# Patient Record
Sex: FEMALE | Race: WHITE | NOT HISPANIC OR LATINO | URBAN - METROPOLITAN AREA
[De-identification: names, ages, dates, MRNs, and addresses within clinical notes are randomized per-mention and may not be internally consistent; named-entity substitution may affect disease eponyms.]

---

## 2020-06-18 ENCOUNTER — TELEPHONE ENCOUNTER (OUTPATIENT)
Dept: URBAN - METROPOLITAN AREA CLINIC 92 | Facility: CLINIC | Age: 70
End: 2020-06-18

## 2020-06-18 ENCOUNTER — LAB OUTSIDE AN ENCOUNTER (OUTPATIENT)
Dept: URBAN - METROPOLITAN AREA CLINIC 86 | Facility: CLINIC | Age: 70
End: 2020-06-18

## 2020-06-19 ENCOUNTER — TELEPHONE ENCOUNTER (OUTPATIENT)
Dept: URBAN - METROPOLITAN AREA CLINIC 92 | Facility: CLINIC | Age: 70
End: 2020-06-19

## 2020-06-19 LAB
ALBUMIN: 4.2
ALKALINE PHOSPHATASE: 152
ALT (SGPT): 188
AST (SGOT): 160
BILIRUBIN, DIRECT: 0.16
BILIRUBIN, TOTAL: 0.5
PROTEIN, TOTAL: 6.9

## 2020-07-11 ENCOUNTER — TELEPHONE ENCOUNTER (OUTPATIENT)
Dept: URBAN - METROPOLITAN AREA CLINIC 92 | Facility: CLINIC | Age: 70
End: 2020-07-11

## 2020-07-11 NOTE — HPI-OTHER HISTORIES
Zack,  saw your letter of june 22 2020.  Jan 2020 ast 65 and alt 69 and alk 138.  June 2020 ast 160 and alt 188 and alkt 152.  Of the med list you sent, tizanidine stands out as you mentioned was even higher and the lowered.  Per livertox: Tizanidine: "Transient and asymptomatic elevations in serum ALT greater than 3 times the upper limit of normal occur in ~5% of patients taking tizanidine compared to 0.4% of subjects on placebo. Reports of severe hepatotoxicity, acute liver failure and death have been mentioned in review articles on tizanidine, but few case reports have been published. In these reports, the latency to onset of jaundice has ranged from 2 to 14 weeks and the enzyme elevations have been both cholestatic and hepatocellular (Case 1). Immunoallergic and autoimmune features have not been mentioned. Recovery was complete after 1 to 2 months.  Likelihood score: C (Probable rare cause of clinically apparent liver injury)."  We need to follow labs now on lower dose and if lower, then points that this is the cause of the issue.  Please share this letter with local provider managing this so that they are awate of the issue.  Dr Garcia.

## 2020-07-21 ENCOUNTER — TELEPHONE ENCOUNTER (OUTPATIENT)
Dept: URBAN - METROPOLITAN AREA CLINIC 92 | Facility: CLINIC | Age: 70
End: 2020-07-21

## 2020-07-23 ENCOUNTER — WEB ENCOUNTER (OUTPATIENT)
Dept: URBAN - METROPOLITAN AREA CLINIC 92 | Facility: CLINIC | Age: 70
End: 2020-07-23

## 2020-07-25 ENCOUNTER — TELEPHONE ENCOUNTER (OUTPATIENT)
Dept: URBAN - METROPOLITAN AREA CLINIC 92 | Facility: CLINIC | Age: 70
End: 2020-07-25

## 2020-07-25 NOTE — HPI-TODAY'S VISIT:
This is a scheduled follow-up appointment for this patient, a 69 year old /White female, after a previous visit on Jan 2020 for an evaluation for elevated liver function tests and and hx of DILI. Recent liver function tests, AST and ALT, are elevated (see labs which were reviewed in the patients records ). The patient reports no significant symptoms related to the elevated LFT's.  The patient relates no significant family or personal history of liver disease. She states no history of new medications or alcohol use. The patient reports a personal history of no other habits that could cause liver damage.  Interval notes:     Pt here for follow up for abnormal lfts.  she stopped montelukast prior to pati due to skin reaction/palpitations. she has cervical stenosis and seen neurosx. she may need sx.  she has a lot of sensitivity. she had labs done prior to stopping singular. she is now on robaxin. seeing dr. zayda davis and may have sx done.    labs 12/20/19 alp 140 ast 56 alt 60  labs 10/31/19 wbc 6 hgb 14.8 plt 298 alp 129 ast 59 alt 53  11/21/19 u/s liver is normal size and contour.  Pt was lost to follow up due to family health issues and last seen nov 2016 and now retruns in Oct 2019.  She says she had ankle surgery issues and she had issues and pain issues and she is now back.  She said that this year saw Dr Hobbs and he encouraged her to come back.  Multiple bronchitis bouts since new years and she says Pulmonologist wanted to montelukast and see how does off this.   We advised can do and just needs close labs.  She lost her voice from asthma and allergies. Prior she had dysphonia also and so now an issue for her.  She says that when went to er oxygen was ok but felt short of breath.  She has lost likely 20 pounds and she says 10 was this year.    saw some issues and is stable and back on protonix and back on this now at 20mg.  Has been seeing Dr Hobbs and he last did egd 9-27-19 and mild pathcy erythema in eg junction and bx taken with cold forceps and mildly erythematous mucosa in gastric fundus and body and antrum.   She says neurologist saw her for her pain and she wanted botox for this and says insurane may not do it. Other options: flexeril/baclofen/klonopin: robaxin better liver tolerated as muscle relaxant Flexeril can inc lfts as can baclofen.   Prior she has been on aleve and occ tylenol. Aleve upsets her stomach.  Needs labs to see what baseline.  Needs liver u.s also to be done.  Says lmg lfts 50s to 70s. Prior had been better.  4-18-17 and wbc 5.2 hg 14.6 plat 268 and glu 87 and cr 0.8 and na 141 and k 4.6 and cl 106 and ca 9.3 and alb 3.8 and tb 0.7 and alk 106 and ast 52 and alt 66.  Has a vocal cord specialist who had seen in past.   9/3/15 wbc 7.3, rbc 4.51, hg 14.2, plat 265, gluc 99, cr 0.92, na 140, k 4.2, albumin 3.9, t bili 0.4, alk 120 high, ast 39, alt 43.   Pt is known to have abnormal liver enzymes and suspected a drug induced process related to Prozac and possibly Wellbutrin. She stopped Prozac in 2013 and Wellbutrin in 2014. She was on prozac for over 20 years.   Feb 2015 labs: wbc 6.3, hg 14.5, plat 279, sugar 66, cr 0.99, na 137 and k 5, ast 55 and alt 59 and alk 148, bili 0.5, alb 4.2.   Nov 2015 ast 45 and alt 57 alk 133.   2/10/15 ultrasound was limited by bowel gas, but liver appeared normal in size and contour, gallbladder removed, no dilatation of biliary tree, patent hepatic vessels.   Duration of visit 25 minutes with greater than 50% of time spent reviewing chart  and prior notes and work up and then in discussion with the patient.

## 2020-07-28 ENCOUNTER — OFFICE VISIT (OUTPATIENT)
Dept: URBAN - METROPOLITAN AREA TELEHEALTH 2 | Facility: TELEHEALTH | Age: 70
End: 2020-07-28
Payer: MEDICARE

## 2020-07-28 DIAGNOSIS — R94.5 ABNORMAL LFTS: ICD-10-CM

## 2020-07-28 DIAGNOSIS — F41.9 ANXIETY AND DEPRESSION: ICD-10-CM

## 2020-07-28 DIAGNOSIS — E66.3 OVERWEIGHT: ICD-10-CM

## 2020-07-28 DIAGNOSIS — Z79.899 HIGH RISK MEDICATION USE: ICD-10-CM

## 2020-07-28 DIAGNOSIS — Z98.890 HISTORY OF COLONOSCOPY: ICD-10-CM

## 2020-07-28 DIAGNOSIS — K71.9 DRUG INDUCED LIVER DISEASE: ICD-10-CM

## 2020-07-28 DIAGNOSIS — K21.0 REFLUX ESOPHAGITIS: ICD-10-CM

## 2020-07-28 DIAGNOSIS — Z71.89 VACCINE COUNSELING: ICD-10-CM

## 2020-07-28 DIAGNOSIS — E03.9 HYPOTHYROIDISM, UNSPECIFIED TYPE: ICD-10-CM

## 2020-07-28 DIAGNOSIS — E78.5 HYPERLIPIDEMIA: ICD-10-CM

## 2020-07-28 DIAGNOSIS — K71.8 TOXIC LIVER DISEASE WITH OTHER DISORDERS OF LIVER: ICD-10-CM

## 2020-07-28 PROCEDURE — G8427 DOCREV CUR MEDS BY ELIG CLIN: HCPCS

## 2020-07-28 PROCEDURE — G8417 CALC BMI ABV UP PARAM F/U: HCPCS

## 2020-07-28 PROCEDURE — 1036F TOBACCO NON-USER: CPT

## 2020-07-28 PROCEDURE — 99214 OFFICE O/P EST MOD 30 MIN: CPT

## 2020-07-28 PROCEDURE — G9903 PT SCRN TBCO ID AS NON USER: HCPCS

## 2020-07-28 RX ORDER — SUCRALFATE 1 G/10ML
TAKE 2 TEASPOONS BY ORAL ROUTE 2 TIMES PER DAY ON AN EMPTY STOMACH 1 HOUR BEFORE LUNCH AND AT BEDTIME SUSPENSION ORAL 2
Qty: 150 | Refills: 2 | Status: DISCONTINUED | COMMUNITY
Start: 2019-10-03

## 2020-07-28 RX ORDER — NAPROXEN SODIUM 220 MG/1
1 TABLET WITH FOOD OR MILK AS NEEDED TABLET ORAL
Refills: 0 | Status: ACTIVE | COMMUNITY
Start: 1900-01-01

## 2020-07-28 RX ORDER — LORAZEPAM 1 MG/1
TAKE 2 TABLETS (2 MG) BY ORAL ROUTE ONCE DAILY AT BEDTIME AS NEEDED TABLET ORAL 1
Qty: 0 | Refills: 0 | Status: ACTIVE | COMMUNITY
Start: 1900-01-01

## 2020-07-28 RX ORDER — ALBUTEROL SULFATE 90 UG/1
1 PUFF AS NEEDED AEROSOL, METERED RESPIRATORY (INHALATION)
Status: ACTIVE | COMMUNITY

## 2020-07-28 RX ORDER — ACETAMINOPHEN 325 MG/1
1 TABLET AS NEEDED TABLET, FILM COATED ORAL
Refills: 0 | Status: ACTIVE | COMMUNITY
Start: 1900-01-01

## 2020-07-28 RX ORDER — LEVOMEFOLATE/ALGAL OIL 15-90.314
TAKE 1 CAPSULE BY ORAL ROUTE DAILY CAPSULE ORAL 1
Qty: 0 | Refills: 0 | Status: DISCONTINUED | COMMUNITY
Start: 1900-01-01

## 2020-07-28 RX ORDER — LITHIUM CARBONATE 300 MG/1
1 CAPSULE AT BEDTIME CAPSULE, GELATIN COATED ORAL ONCE A DAY
Status: ACTIVE | COMMUNITY

## 2020-07-28 RX ORDER — CONJUGATED ESTROGENS 0.62 MG/G
EVERY PTHER WEEK CREAM VAGINAL
Refills: 0 | Status: ACTIVE | COMMUNITY
Start: 1900-01-01

## 2020-07-28 NOTE — HPI-TODAY'S VISIT:
This is a scheduled follow-up appointment for this patient, a 69 year old /White female, after a previous visit on 2020 for an evaluation for elevated liver function tests and and hx of DILI.  The patient reports no significant symptoms related to the elevated LFT's.    The patient relates no significant family or personal history of liver disease. She states no history of new medications or alcohol use. The patient reports a personal history of no other habits that could cause liver damage.   Interval notes:   She stopped montelukast prior to 2019 due to skin reaction/palpitations.  She has cervical stenosis and seen neurosurgery and she did have surgery 20.  She has a lot of sensitivity to meds. Dr. Chadwick Staples and may have sx done.   Zack sent letter of 2020.  2020 ast 65 and alt 69 and alk 138.  2020 ast 160 and alt 188 and alkt 152.  Of the med list that she sent, tizanidine stood out as you mentioned was even higher and the lowered.  She stopped it 2020: she spoke with neurolosurgeon and also neurologist and did stop off.  Per livertox: Tizanidine: "Transient and asymptomatic elevations in serum ALT greater than 3 times the upper limit of normal occur in ~5% of patients taking tizanidine compared to 0.4% of subjects on placebo. Reports of severe hepatotoxicity, acute liver failure and death have been mentioned in review articles on tizanidine, but few case reports have been published. In these reports, the latency to onset of jaundice has ranged from 2 to 14 weeks and the enzyme elevations have been both cholestatic and hepatocellular (Case 1). Immunoallergic and autoimmune features have not been mentioned. Recovery was complete after 1 to 2 months.  Likelihood score: C (Probable rare cause of clinically apparent liver injury)."  Did labs on :  alb 3.8 and tb 0.4 and alk 122 and ast 74 and alt 82.   Labs better off the tizanidine.  Need to do an u/s.  deplin:  looked up re this: Per webmd: "Signs of an allergic reaction, like rash; hives; itching; red, swollen, blistered, or peeling skin with or without fever; wheezing; tightness in the chest or throat; trouble breathing, swallowing, or talking; unusual hoarseness; or swelling of the mouth, face, lips, tongue, or throat. A burning or tingling feeling that is not normal. Swelling. What are some other side effects of this drug? All drugs may cause side effects. However, many people have no side effects or only have minor side effects. Call your doctor or get medical help if any of these side effects or any other side effects bother you or do not go away:  Upset stomach or throwing up. Belly pain. Not hungry. Diarrhea. Feeling sleepy. Headache. Pimples (acne)."   19 alp 140 ast 56 alt 60  10/31/19 wbc 6 hgb 14.8 plt 298 alp 129 ast 59 alt 53  19 u/s liver is normal size and contour.  Pt was lost to follow up due to family health issues and from 2016 and returned in Oct 2019.  She said that this year saw Dr Hobbs and he had encouraged her to come back to see us.  Multiple bronchitis bouts since new years and she says Pulmonologist wanted to montelukast and see how does off this.   Prior she lost her voice from asthma and allergies. Prior she had dysphonia also and so now an issue for her.  She says that when went to er oxygen was ok but felt short of breath.  She had prior lost likely 20 pounds and she says 10 was this year and says she is down to 135. Lowest 125 and up to 135 now.  Has been seeing Dr Hobbs and he last did egd 19 and mild patchy erythema in eg junction and bx taken with cold forceps and mildly erythematous mucosa in gastric fundus and body and antrum.   She says neurologist saw her for her pain and she wanted botox for this and says insurane is covering this.  She says that it helped her a lot to help with her muscle issues.  17 and wbc 5.2 hg 14.6 plat 268 and glu 87 and cr 0.8 and na 141 and k 4.6 and cl 106 and ca 9.3 and alb 3.8 and tb 0.7 and alk 106 and ast 52 and alt 66.  Has a vocal cord specialist who had seen in past.   9/3/15 wbc 7.3, rbc 4.51, hg 14.2, plat 265, gluc 99, cr 0.92, na 140, k 4.2, albumin 3.9, t bili 0.4, alk 120 high, ast 39, alt 43.   Pt is known to have abnormal liver enzymes and suspected a drug induced process related to Prozac and possibly Wellbutrin. She stopped Prozac in  and Wellbutrin in . She was on prozac for over 20 years.   2015 labs: wbc 6.3, hg 14.5, plat 279, sugar 66, cr 0.99, na 137 and k 5, ast 55 and alt 59 and alk 148, bili 0.5, alb 4.2.   2015 ast 45 and alt 57 alk 133.   2/10/15 ultrasound was limited by bowel gas, but liver appeared normal in size and contour, gallbladder removed, no dilatation of biliary tree, patent hepatic vessels.   Stressed to pt the need for social distancing and strict handwashing and wearing a mask and to follow any other new or added CDC recommendations as this is an evolving target.  Duration of visit 26 minutes with greater than 50% of time spent reviewing chart  and prior notes and work up and then in discussion with the patient.   Attempts were made to use real-time two-way video technology for today's encounter. Due to a technological failure or access issues, telephone technology was used in lieu of an office visit due to the current COVID-19 pandemic crisis and need for social isolation.  Patient seen today via telehealth by agreement and consent of patient in light of current COVID-19 pandemic. I used video conferencing during the visit. The patient encounter is appropriate and reasonable under the circumstances given the patient's particular presentation at this time. The patient has been advised of the followin) the potential risks and limitations of this mode of treatment (including but not limited to the absence of in-person examination); 2) the right to refuse telehealth services at any point without affecting the right to future care; 3) the right to receive in-person services, included immediately after this consultation if an urgent need arises; 4) information, including identifiable images or information from this telehealth consult, will only be shared in accordance with HIPAA regulations. Any and all of the patient's and/or patient's family member's questions on this issue have been answered. The patient has verbally consented to be treated via telehealth services. The patient has also been advised to contact this office for worsening conditions or problems, and seek emergency medical treatment and/or call 911 if the patient deems either necessary.

## 2020-08-19 ENCOUNTER — OFFICE VISIT (OUTPATIENT)
Dept: URBAN - METROPOLITAN AREA CLINIC 77 | Facility: CLINIC | Age: 70
End: 2020-08-19
Payer: MEDICARE

## 2020-08-19 DIAGNOSIS — R74.8 ABNORMAL ALKALINE PHOSPHATASE TEST: ICD-10-CM

## 2020-08-19 PROCEDURE — 93975 VASCULAR STUDY: CPT

## 2020-08-19 PROCEDURE — 76705 ECHO EXAM OF ABDOMEN: CPT

## 2020-08-19 RX ORDER — LORAZEPAM 1 MG/1
TAKE 2 TABLETS (2 MG) BY ORAL ROUTE ONCE DAILY AT BEDTIME AS NEEDED TABLET ORAL 1
Qty: 0 | Refills: 0 | Status: ACTIVE | COMMUNITY
Start: 1900-01-01

## 2020-08-19 RX ORDER — ALBUTEROL SULFATE 90 UG/1
1 PUFF AS NEEDED AEROSOL, METERED RESPIRATORY (INHALATION)
Status: ACTIVE | COMMUNITY

## 2020-08-19 RX ORDER — ACETAMINOPHEN 325 MG/1
1 TABLET AS NEEDED TABLET, FILM COATED ORAL
Refills: 0 | Status: ACTIVE | COMMUNITY
Start: 1900-01-01

## 2020-08-19 RX ORDER — LITHIUM CARBONATE 300 MG/1
1 CAPSULE AT BEDTIME CAPSULE, GELATIN COATED ORAL ONCE A DAY
Status: ACTIVE | COMMUNITY

## 2020-08-19 RX ORDER — CONJUGATED ESTROGENS 0.62 MG/G
EVERY PTHER WEEK CREAM VAGINAL
Refills: 0 | Status: ACTIVE | COMMUNITY
Start: 1900-01-01

## 2020-08-19 RX ORDER — NAPROXEN SODIUM 220 MG/1
1 TABLET WITH FOOD OR MILK AS NEEDED TABLET ORAL
Refills: 0 | Status: ACTIVE | COMMUNITY
Start: 1900-01-01

## 2020-08-20 ENCOUNTER — TELEPHONE ENCOUNTER (OUTPATIENT)
Dept: URBAN - METROPOLITAN AREA CLINIC 92 | Facility: CLINIC | Age: 70
End: 2020-08-20

## 2020-08-20 NOTE — HPI-TODAY'S VISIT:
Dealinden Dixon, U.s: back: no suspicious lesions. Absent gallbladder. No biie duct dilation. Patent vessels. Spleen 11.5 cm. Right kidney 9.7cm and no hydronephrosis. Pancreas appears normal.  Liver normal echogeicity.   Thanks  Dr Garcia

## 2020-08-21 ENCOUNTER — LAB OUTSIDE AN ENCOUNTER (OUTPATIENT)
Dept: URBAN - METROPOLITAN AREA CLINIC 86 | Facility: CLINIC | Age: 70
End: 2020-08-21

## 2020-08-22 ENCOUNTER — TELEPHONE ENCOUNTER (OUTPATIENT)
Dept: URBAN - METROPOLITAN AREA CLINIC 92 | Facility: CLINIC | Age: 70
End: 2020-08-22

## 2020-08-22 LAB
A/G RATIO: 1.5
ALBUMIN: 4.3
ALKALINE PHOSPHATASE: 137
ALT (SGPT): 88
AST (SGOT): 81
BILIRUBIN, TOTAL: 0.4
BUN/CREATININE RATIO: 16
BUN: 14
CALCIUM: 9.8
CARBON DIOXIDE, TOTAL: 24
CHLORIDE: 102
CREATININE: 0.85
EGFR IF AFRICN AM: 80
EGFR IF NONAFRICN AM: 70
GLOBULIN, TOTAL: 2.9
GLUCOSE: 82
POTASSIUM: 4.5
PROTEIN, TOTAL: 7.2
SODIUM: 139

## 2020-08-22 NOTE — HPI-TODAY'S VISIT:
Dear Zack Feng The results of your recent tests are explained below: glu 82 and bun 14 and cr 0.85 and na 139 and k 4.5 and cl 102 and ca 9.8 and alb 4.3 and tb 0.4 and alk 137 and asr 81 and alt 88 and desired <35.  In june 2020 ast 160 and alt 188 and alk 152 so coming down from this and need to track this.  May have been the tizanidine as we discussed.  Need to follow over time.

## 2020-08-24 ENCOUNTER — LAB OUTSIDE AN ENCOUNTER (OUTPATIENT)
Dept: URBAN - METROPOLITAN AREA TELEHEALTH 2 | Facility: TELEHEALTH | Age: 70
End: 2020-08-24

## 2020-09-14 ENCOUNTER — LAB OUTSIDE AN ENCOUNTER (OUTPATIENT)
Dept: URBAN - METROPOLITAN AREA TELEHEALTH 2 | Facility: TELEHEALTH | Age: 70
End: 2020-09-14

## 2020-09-18 ENCOUNTER — TELEPHONE ENCOUNTER (OUTPATIENT)
Dept: URBAN - METROPOLITAN AREA CLINIC 92 | Facility: CLINIC | Age: 70
End: 2020-09-18

## 2020-09-18 LAB
ALBUMIN: 4.2
ALKALINE PHOSPHATASE: 121
ALT (SGPT): 82
AST (SGOT): 72
BILIRUBIN, DIRECT: 0.11
BILIRUBIN, TOTAL: 0.3
PROTEIN, TOTAL: 6.6

## 2020-09-18 NOTE — HPI-OTHER HISTORIES
Dear Zack Feng The results of your recent tests are explained below: labs MUCH lower ast 72 from 160. alt down to 82 from 188. alk down to 121 from 152 and tb down to 0.3 from 0.5 for the comparison points listed. Tizanidine I  believe was the medicine in question.

## 2020-09-28 ENCOUNTER — OFFICE VISIT (OUTPATIENT)
Dept: URBAN - METROPOLITAN AREA TELEHEALTH 2 | Facility: TELEHEALTH | Age: 70
End: 2020-09-28
Payer: MEDICARE

## 2020-09-28 ENCOUNTER — TELEPHONE ENCOUNTER (OUTPATIENT)
Dept: URBAN - METROPOLITAN AREA CLINIC 6 | Facility: CLINIC | Age: 70
End: 2020-09-28

## 2020-09-28 DIAGNOSIS — R94.5 ABNORMAL LFTS: ICD-10-CM

## 2020-09-28 DIAGNOSIS — E66.3 OVERWEIGHT: ICD-10-CM

## 2020-09-28 DIAGNOSIS — K21.0 REFLUX ESOPHAGITIS: ICD-10-CM

## 2020-09-28 DIAGNOSIS — E03.9 HYPOTHYROIDISM, UNSPECIFIED TYPE: ICD-10-CM

## 2020-09-28 DIAGNOSIS — K71.9 DRUG INDUCED LIVER DISEASE: ICD-10-CM

## 2020-09-28 DIAGNOSIS — E78.5 HYPERLIPIDEMIA: ICD-10-CM

## 2020-09-28 DIAGNOSIS — F41.9 ANXIETY AND DEPRESSION: ICD-10-CM

## 2020-09-28 PROCEDURE — G8420 CALC BMI NORM PARAMETERS: HCPCS

## 2020-09-28 PROCEDURE — 3017F COLORECTAL CA SCREEN DOC REV: CPT

## 2020-09-28 PROCEDURE — 99214 OFFICE O/P EST MOD 30 MIN: CPT

## 2020-09-28 PROCEDURE — G9903 PT SCRN TBCO ID AS NON USER: HCPCS

## 2020-09-28 PROCEDURE — G8427 DOCREV CUR MEDS BY ELIG CLIN: HCPCS

## 2020-09-28 PROCEDURE — 1036F TOBACCO NON-USER: CPT

## 2020-09-28 RX ORDER — LITHIUM CARBONATE 300 MG/1
1 CAPSULE AT BEDTIME CAPSULE, GELATIN COATED ORAL ONCE A DAY
Status: ACTIVE | COMMUNITY

## 2020-09-28 RX ORDER — ACETAMINOPHEN 325 MG/1
1 TABLET AS NEEDED TABLET, FILM COATED ORAL
Refills: 0 | Status: ACTIVE | COMMUNITY
Start: 1900-01-01

## 2020-09-28 RX ORDER — CONJUGATED ESTROGENS 0.62 MG/G
EVERY PTHER WEEK CREAM VAGINAL
Refills: 0 | Status: ACTIVE | COMMUNITY
Start: 1900-01-01

## 2020-09-28 RX ORDER — ALBUTEROL SULFATE 90 UG/1
1 PUFF AS NEEDED AEROSOL, METERED RESPIRATORY (INHALATION)
Status: ACTIVE | COMMUNITY

## 2020-09-28 RX ORDER — LEVOMEFOLATE/ALGAL OIL 15-90.314
1 CAPSULE CAPSULE ORAL ONCE A DAY
Status: ACTIVE | COMMUNITY

## 2020-09-28 RX ORDER — LORAZEPAM 1 MG/1
TAKE 2 TABLETS (2 MG) BY ORAL ROUTE ONCE DAILY AT BEDTIME AS NEEDED TABLET ORAL 1
Qty: 0 | Refills: 0 | Status: ACTIVE | COMMUNITY
Start: 1900-01-01

## 2020-09-28 RX ORDER — NAPROXEN SODIUM 220 MG/1
1 TABLET WITH FOOD OR MILK AS NEEDED TABLET ORAL
Refills: 0 | Status: ACTIVE | COMMUNITY
Start: 1900-01-01

## 2020-09-28 NOTE — HPI-TODAY'S VISIT:
This is a scheduled follow-up appointment for this patient, a 70 year old /White female, after a previous visit on 2020 for an evaluation for elevated liver function tests and and hx of DILI.  The patient reports no significant symptoms related to the elevated LFT's.    The patient relates no significant family or personal history of liver disease. She states no history of new medications or alcohol use. The patient reports a personal history of no other habits that could cause liver damage.   Interval notes:   2020  labs MUCH lower ast 72 from 160. alt down to 82 from 188. alk down to 121 from 152 and tb down to 0.3 from 0.5 for the comparison points listed.   Tizanidine I  believe was the medicine in question.  She has been off this 2020.  Prior labs  glu 82 and bun 14 and cr 0.85 and na 139 and k 4.5 and cl 102 and ca 9.8 and alb 4.3 and tb 0.4 and alk 137 and ast 81 and alt 88 and desired  less than 35.   In 2020 ast 160 and alt 188 and alk 152 so coming down from this and need to track this.    U.s: back: no suspicious lesions. Absent gallbladder. No biie duct dilation. Patent vessels. Spleen 11.5 cm. Right kidney 9.7cm and no hydronephrosis. Pancreas appears normal.  Liver normal echogeicity.    She is to do sleep study for fatigue.  She stopped montelukast prior to 2019 due to skin reaction/palpitations.  She has cervical stenosis and seen neurosurgery and she did have surgery 20.  She has a lot of sensitivity to meds. Dr. Chadwick Staples.   Zack sent letter of 2020.  2020 ast 65 and alt 69 and alk 138.  2020 ast 160 and alt 188 and alkt 152.  She stopped it 2020: she spoke with neurosurgeon and also neurologist and did stop off.  Per livertox: Tizanidine: "Transient and asymptomatic elevations in serum ALT greater than 3 times the upper limit of normal occur in ~5% of patients taking tizanidine compared to 0.4% of subjects on placebo. Reports of severe hepatotoxicity, acute liver failure and death have been mentioned in review articles on tizanidine, but few case reports have been published. In these reports, the latency to onset of jaundice has ranged from 2 to 14 weeks and the enzyme elevations have been both cholestatic and hepatocellular (Case 1). Immunoallergic and autoimmune features have not been mentioned. Recovery was complete after 1 to 2 months.  Likelihood score: C (Probable rare cause of clinically apparent liver injury)."  deplin:  looked up re this: Per webmd: "Signs of an allergic reaction, like rash; hives; itching; red, swollen, blistered, or peeling skin with or without fever; wheezing; tightness in the chest or throat; trouble breathing, swallowing, or talking; unusual hoarseness; or swelling of the mouth, face, lips, tongue, or throat. A burning or tingling feeling that is not normal. Swelling. What are some other side effects of this drug? All drugs may cause side effects. However, many people have no side effects or only have minor side effects. Call your doctor or get medical help if any of these side effects or any other side effects bother you or do not go away:  Upset stomach or throwing up. Belly pain. Not hungry. Diarrhea. Feeling sleepy. Headache. Pimples (acne)."   19 alp 140 ast 56 alt 60  10/31/19 wbc 6 hgb 14.8 plt 298 alp 129 ast 59 alt 53  19 u/s liver is normal size and contour.  Pt was lost to follow up due to family health issues and from 2016 and returned in Oct 2019.  She saw in 2019 saw Dr Hobbs and he had encouraged her to come back to see us.  Prior she lost her voice from asthma and allergies. Prior she had dysphonia also and so now an issue for her.  She had prior lost likely 20 pounds and she says 10 was this year and says she is down to 135. Lowest 125 with surgery and up to 135 last time and now 134.   Has been seeing Dr Hobbs and he last did egd 19 and mild patchy erythema in eg junction and bx taken with cold forceps and mildly erythematous mucosa in gastric fundus and body and antrum.   She says neurologist saw her for her pain and she wanted botox for this and says insurane is covering this.  She says that it helped her a lot to help with her muscle issues.  -17 and wbc 5.2 hg 14.6 plat 268 and glu 87 and cr 0.8 and na 141 and k 4.6 and cl 106 and ca 9.3 and alb 3.8 and tb 0.7 and alk 106 and ast 52 and alt 66.  Has a vocal cord specialist who had seen in past.   9/3/15 wbc 7.3, rbc 4.51, hg 14.2, plat 265, gluc 99, cr 0.92, na 140, k 4.2, albumin 3.9, t bili 0.4, alk 120 high, ast 39, alt 43.   Pt is known to have abnormal liver enzymes and suspected a drug induced process related to Prozac and possibly Wellbutrin. She stopped Prozac in  and Wellbutrin in . She was on prozac for over 20 years.   2015 labs: wbc 6.3, hg 14.5, plat 279, sugar 66, cr 0.99, na 137 and k 5, ast 55 and alt 59 and alk 148, bili 0.5, alb 4.2.   2015 ast 45 and alt 57 alk 133.   2/10/15 ultrasound was limited by bowel gas, but liver appeared normal in size and contour, gallbladder removed, no dilatation of biliary tree, patent hepatic vessels.   Stressed to pt the need for social distancing and strict handwashing and wearing a mask and to follow any other new or added CDC recommendations as this is an evolving target.  Plan: 1. Pt will do labs in 6 and 12 weeks, 2. Started deplin aug 9 and had been off 5m. Need to watch the labs. 3. RTC in 3m.  Duration of visit 25 minutes via doximity with greater than 50% of time spent reviewing chart  and prior notes and work up and then in discussion with the patient.   More than half of the face-to-face time used for counseling and coordination of care.  Patient seen today via telehealth by agreement and consent of patient in light of current COVID-19 pandemic. I used video conferencing during the visit. The patient encounter is appropriate and reasonable under the circumstances given the patient's particular presentation at this time. The patient has been advised of the followin) the potential risks and limitations of this mode of treatment (including but not limited to the absence of in-person examination); 2) the right to refuse telehealth services at any point without affecting the right to future care; 3) the right to receive in-person services, included immediately after this consultation if an urgent need arises; 4) information, including identifiable images or information from this telehealth consult, will only be shared in accordance with HIPAA regulations. Any and all of the patient's and/or patient's family member's questions on this issue have been answered. The patient has verbally consented to be treated via telehealth services. The patient has also been advised to contact this office for worsening conditions or problems, and seek emergency medical treatment and/or call 911 if the patient deems either necessary.

## 2020-10-19 ENCOUNTER — LAB OUTSIDE AN ENCOUNTER (OUTPATIENT)
Dept: URBAN - METROPOLITAN AREA TELEHEALTH 2 | Facility: TELEHEALTH | Age: 70
End: 2020-10-19

## 2020-10-27 ENCOUNTER — TELEPHONE ENCOUNTER (OUTPATIENT)
Dept: URBAN - METROPOLITAN AREA CLINIC 92 | Facility: CLINIC | Age: 70
End: 2020-10-27

## 2020-10-27 LAB
ALBUMIN: 4.2
ALKALINE PHOSPHATASE: 140
ALT (SGPT): 72
AST (SGOT): 69
BILIRUBIN, DIRECT: 0.14
BILIRUBIN, TOTAL: 0.3
PROTEIN, TOTAL: 7

## 2020-10-27 NOTE — HPI-OTHER HISTORIES
Dear Zack Feng The results of your recent tests are explained below: oct 19: ast 69 and alt 72 and little lower from ast 72 and alt 82 and prior ast 160 and alt 188. tb 0.3. alb 4.2.

## 2020-11-30 ENCOUNTER — LAB OUTSIDE AN ENCOUNTER (OUTPATIENT)
Dept: URBAN - METROPOLITAN AREA TELEHEALTH 2 | Facility: TELEHEALTH | Age: 70
End: 2020-11-30

## 2020-12-08 ENCOUNTER — TELEPHONE ENCOUNTER (OUTPATIENT)
Dept: URBAN - METROPOLITAN AREA CLINIC 92 | Facility: CLINIC | Age: 70
End: 2020-12-08

## 2020-12-08 LAB
ALBUMIN: 4.1
ALKALINE PHOSPHATASE: 127
ALT (SGPT): 79
AST (SGOT): 70
BILIRUBIN, DIRECT: 0.15
BILIRUBIN, TOTAL: 0.5
PROTEIN, TOTAL: 6.9

## 2020-12-08 NOTE — HPI-OTHER HISTORIES
Dear Zack Feng The results of your recent tests are explained below: dec 7 alb 4.1 and tb 0.5 and alk 127 down from 140 and ast 70 and alt 79 and prior ast 69 and alt 72 so little bit of a flux but lower vs sept ast 72 and alt 82.  Any changes?

## 2020-12-16 ENCOUNTER — OFFICE VISIT (OUTPATIENT)
Dept: URBAN - METROPOLITAN AREA TELEHEALTH 2 | Facility: TELEHEALTH | Age: 70
End: 2020-12-16
Payer: MEDICARE

## 2020-12-16 DIAGNOSIS — E03.9 HYPOTHYROIDISM: ICD-10-CM

## 2020-12-16 DIAGNOSIS — E03.8 ADULT ONSET HYPOTHYROIDISM: ICD-10-CM

## 2020-12-16 DIAGNOSIS — Z71.89 VACCINE COUNSELING: ICD-10-CM

## 2020-12-16 DIAGNOSIS — Z98.890 HISTORY OF COLONOSCOPY: ICD-10-CM

## 2020-12-16 DIAGNOSIS — F41.9 ANXIETY AND DEPRESSION: ICD-10-CM

## 2020-12-16 DIAGNOSIS — R94.5 ABNORMAL LFTS: ICD-10-CM

## 2020-12-16 DIAGNOSIS — F41.8 ANXIETY ABOUT HEALTH: ICD-10-CM

## 2020-12-16 DIAGNOSIS — K71.9 DRUG INDUCED LIVER DISEASE: ICD-10-CM

## 2020-12-16 DIAGNOSIS — Z79.899 HIGH RISK MEDICATION USE: ICD-10-CM

## 2020-12-16 DIAGNOSIS — E78.5 HYPERLIPIDEMIA: ICD-10-CM

## 2020-12-16 PROCEDURE — G8427 DOCREV CUR MEDS BY ELIG CLIN: HCPCS | Performed by: PHYSICIAN ASSISTANT

## 2020-12-16 PROCEDURE — 99214 OFFICE O/P EST MOD 30 MIN: CPT | Performed by: PHYSICIAN ASSISTANT

## 2020-12-16 RX ORDER — LEVOMEFOLATE/ALGAL OIL 15-90.314
1 CAPSULE CAPSULE ORAL ONCE A DAY
Status: ACTIVE | COMMUNITY

## 2020-12-16 RX ORDER — NAPROXEN SODIUM 220 MG/1
1 TABLET WITH FOOD OR MILK AS NEEDED TABLET ORAL
Refills: 0 | Status: ACTIVE | COMMUNITY
Start: 1900-01-01

## 2020-12-16 RX ORDER — ACETAMINOPHEN 325 MG/1
1 TABLET AS NEEDED TABLET, FILM COATED ORAL
Refills: 0 | Status: ACTIVE | COMMUNITY
Start: 1900-01-01

## 2020-12-16 RX ORDER — ALBUTEROL SULFATE 90 UG/1
1 PUFF AS NEEDED AEROSOL, METERED RESPIRATORY (INHALATION)
Status: ACTIVE | COMMUNITY

## 2020-12-16 RX ORDER — LITHIUM CARBONATE 300 MG/1
1 CAPSULE AT BEDTIME CAPSULE, GELATIN COATED ORAL ONCE A DAY
Status: ACTIVE | COMMUNITY

## 2020-12-16 RX ORDER — CONJUGATED ESTROGENS 0.62 MG/G
EVERY PTHER WEEK CREAM VAGINAL
Refills: 0 | Status: ACTIVE | COMMUNITY
Start: 1900-01-01

## 2020-12-16 RX ORDER — LORAZEPAM 1 MG/1
TAKE 2 TABLETS (2 MG) BY ORAL ROUTE ONCE DAILY AT BEDTIME AS NEEDED TABLET ORAL 1
Qty: 0 | Refills: 0 | Status: ACTIVE | COMMUNITY
Start: 1900-01-01

## 2020-12-16 NOTE — HPI-TODAY'S VISIT:
This is a scheduled follow-up appointment for this patient, a 70 year old /White female, after a previous visit on Jan 2020 for an evaluation for elevated liver function tests and and hx of DILI.    The patient reports no significant symptoms related to the elevated LFT's.    her lfts have improved from june 2020 off tizanidine. still on deplin daily. her alp improved, but ast/alt stable around 70 range.   she has inc lithium dose for the past 2 weeks. she is taking lorezapam daily and liver tox: Hepatotoxicity Lorazepam, as with other benzodiazepines, is rarely associated with serum ALT elevations, and clinically apparent liver injury from lorazepam is extremely rare, if it occurs at all. There have been no case reports of symptomatic, acute liver injury from lorazepam. Cases of clinically apparent hepatitis have been reported with other benzodiazepines including alprazolam, chlordiazepoxide, clonazepam, diazepam, flurazepam and triazolam. The clinical pattern of acute liver injury from benzodiazepines is typically cholestatic and mild-to-moderate in severity with a latency of 1 to 6 months. Fever and rash are not common nor is autoantibody formation.  Likelihood score: E (Unlikely cause of clinically apparent liver injury).  RECAP: Sept 2020  labs MUCH lower ast 72 from 160. alt down to 82 from 188. alk down to 121 from 152 and tb down to 0.3 from 0.5 for the comparison points listed.   Tizanidine I  believe was the medicine in question.  She has been off this July 1 2020.  Prior labs  glu 82 and bun 14 and cr 0.85 and na 139 and k 4.5 and cl 102 and ca 9.8 and alb 4.3 and tb 0.4 and alk 137 and ast 81 and alt 88 and desired  less than 35.   In june 2020 ast 160 and alt 188 and alk 152 so coming down from this and need to track this.    U.s: back: no suspicious lesions. Absent gallbladder. No biie duct dilation. Patent vessels. Spleen 11.5 cm. Right kidney 9.7cm and no hydronephrosis. Pancreas appears normal.  Liver normal echogeicity.    She is to do sleep study for fatigue.  She stopped montelukast prior to christmas 2019 due to skin reaction/palpitations.  She has cervical stenosis and seen neurosurgery and she did have surgery 4-14-20.  She has a lot of sensitivity to meds. Dr. Chadwick StaplesEbonie Dixon sent letter of june 22 2020.  Jan 2020 ast 65 and alt 69 and alk 138.  June 2020 ast 160 and alt 188 and alkt 152.  She stopped it July 1, 2020: she spoke with neurosurgeon and also neurologist and did stop off.  Per livertox: Tizanidine: "Transient and asymptomatic elevations in serum ALT greater than 3 times the upper limit of normal occur in ~5% of patients taking tizanidine compared to 0.4% of subjects on placebo. Reports of severe hepatotoxicity, acute liver failure and death have been mentioned in review articles on tizanidine, but few case reports have been published. In these reports, the latency to onset of jaundice has ranged from 2 to 14 weeks and the enzyme elevations have been both cholestatic and hepatocellular (Case 1). Immunoallergic and autoimmune features have not been mentioned. Recovery was complete after 1 to 2 months.  Likelihood score: C (Probable rare cause of clinically apparent liver injury)."  deplin:  looked up re this: Per webmd: "Signs of an allergic reaction, like rash; hives; itching; red, swollen, blistered, or peeling skin with or without fever; wheezing; tightness in the chest or throat; trouble breathing, swallowing, or talking; unusual hoarseness; or swelling of the mouth, face, lips, tongue, or throat. A burning or tingling feeling that is not normal. Swelling. What are some other side effects of this drug? All drugs may cause side effects. However, many people have no side effects or only have minor side effects. Call your doctor or get medical help if any of these side effects or any other side effects bother you or do not go away:

## 2021-01-11 ENCOUNTER — TELEPHONE ENCOUNTER (OUTPATIENT)
Dept: URBAN - METROPOLITAN AREA CLINIC 92 | Facility: CLINIC | Age: 71
End: 2021-01-11

## 2021-01-12 ENCOUNTER — TELEPHONE ENCOUNTER (OUTPATIENT)
Dept: URBAN - METROPOLITAN AREA CLINIC 92 | Facility: CLINIC | Age: 71
End: 2021-01-12

## 2021-01-13 ENCOUNTER — LAB OUTSIDE AN ENCOUNTER (OUTPATIENT)
Dept: URBAN - METROPOLITAN AREA TELEHEALTH 2 | Facility: TELEHEALTH | Age: 71
End: 2021-01-13

## 2021-01-15 ENCOUNTER — LAB OUTSIDE AN ENCOUNTER (OUTPATIENT)
Dept: URBAN - METROPOLITAN AREA CLINIC 86 | Facility: CLINIC | Age: 71
End: 2021-01-15

## 2021-01-20 LAB
A/G RATIO: 1.5
ALBUMIN: 4.3
ALKALINE PHOSPHATASE: 131
ALT (SGPT): 88
AST (SGOT): 76
BILIRUBIN, TOTAL: 0.6
BUN/CREATININE RATIO: 10
BUN: 8
CALCIUM: 9.8
CARBON DIOXIDE, TOTAL: 25
CHLORIDE: 100
CREATININE: 0.81
EGFR IF AFRICN AM: 85
EGFR IF NONAFRICN AM: 73
GLOBULIN, TOTAL: 2.9
GLUCOSE: 85
POTASSIUM: 4.4
PROTEIN, TOTAL: 7.2
SODIUM: 137

## 2021-01-28 ENCOUNTER — WEB ENCOUNTER (OUTPATIENT)
Dept: URBAN - METROPOLITAN AREA CLINIC 86 | Facility: CLINIC | Age: 71
End: 2021-01-28

## 2021-01-29 ENCOUNTER — TELEPHONE ENCOUNTER (OUTPATIENT)
Dept: URBAN - METROPOLITAN AREA CLINIC 98 | Facility: CLINIC | Age: 71
End: 2021-01-29

## 2021-02-02 ENCOUNTER — OFFICE VISIT (OUTPATIENT)
Dept: URBAN - METROPOLITAN AREA CLINIC 97 | Facility: CLINIC | Age: 71
End: 2021-02-02
Payer: MEDICARE

## 2021-02-02 DIAGNOSIS — R74.8 ABNORMAL LIVER ENZYMES: ICD-10-CM

## 2021-02-02 DIAGNOSIS — K74.69 OTHER CIRRHOSIS OF LIVER: ICD-10-CM

## 2021-02-02 PROCEDURE — 93975 VASCULAR STUDY: CPT

## 2021-02-02 PROCEDURE — 76705 ECHO EXAM OF ABDOMEN: CPT

## 2021-02-02 RX ORDER — ALBUTEROL SULFATE 90 UG/1
1 PUFF AS NEEDED AEROSOL, METERED RESPIRATORY (INHALATION)
Status: ACTIVE | COMMUNITY

## 2021-02-02 RX ORDER — LITHIUM CARBONATE 300 MG/1
1 CAPSULE AT BEDTIME CAPSULE, GELATIN COATED ORAL ONCE A DAY
Status: ACTIVE | COMMUNITY

## 2021-02-02 RX ORDER — NAPROXEN SODIUM 220 MG/1
1 TABLET WITH FOOD OR MILK AS NEEDED TABLET ORAL
Refills: 0 | Status: ACTIVE | COMMUNITY
Start: 1900-01-01

## 2021-02-02 RX ORDER — LORAZEPAM 1 MG/1
TAKE 2 TABLETS (2 MG) BY ORAL ROUTE ONCE DAILY AT BEDTIME AS NEEDED TABLET ORAL 1
Qty: 0 | Refills: 0 | Status: ACTIVE | COMMUNITY
Start: 1900-01-01

## 2021-02-02 RX ORDER — LEVOMEFOLATE/ALGAL OIL 15-90.314
1 CAPSULE CAPSULE ORAL ONCE A DAY
Status: ACTIVE | COMMUNITY

## 2021-02-02 RX ORDER — ACETAMINOPHEN 325 MG/1
1 TABLET AS NEEDED TABLET, FILM COATED ORAL
Refills: 0 | Status: ACTIVE | COMMUNITY
Start: 1900-01-01

## 2021-02-02 RX ORDER — CONJUGATED ESTROGENS 0.62 MG/G
EVERY PTHER WEEK CREAM VAGINAL
Refills: 0 | Status: ACTIVE | COMMUNITY
Start: 1900-01-01

## 2021-02-09 ENCOUNTER — LAB OUTSIDE AN ENCOUNTER (OUTPATIENT)
Dept: URBAN - METROPOLITAN AREA CLINIC 92 | Facility: CLINIC | Age: 71
End: 2021-02-09

## 2021-02-09 ENCOUNTER — TELEPHONE ENCOUNTER (OUTPATIENT)
Dept: URBAN - METROPOLITAN AREA CLINIC 92 | Facility: CLINIC | Age: 71
End: 2021-02-09

## 2021-02-09 NOTE — HPI-OTHER HISTORIES
Dear Zack Feng, Feb 2021 u.s: patent vessels seen. Liver mildly coarse appearing to them. Spleen 10.7cm.  Right kidney 10.3cm. No hydronephrosis. Common bile duct 4mm and normal. Pancreas unremarkable. Liver not echogenic prior and so need labs to correlate with this and so please do lfts for us now to see if maybe liver more inflamed?  Dr Garcia

## 2021-02-10 ENCOUNTER — WEB ENCOUNTER (OUTPATIENT)
Dept: URBAN - METROPOLITAN AREA CLINIC 86 | Facility: CLINIC | Age: 71
End: 2021-02-10

## 2021-02-16 ENCOUNTER — LAB OUTSIDE AN ENCOUNTER (OUTPATIENT)
Dept: URBAN - METROPOLITAN AREA TELEHEALTH 2 | Facility: TELEHEALTH | Age: 71
End: 2021-02-16

## 2021-02-16 ENCOUNTER — WEB ENCOUNTER (OUTPATIENT)
Dept: URBAN - METROPOLITAN AREA CLINIC 86 | Facility: CLINIC | Age: 71
End: 2021-02-16

## 2021-02-17 ENCOUNTER — WEB ENCOUNTER (OUTPATIENT)
Dept: URBAN - METROPOLITAN AREA CLINIC 86 | Facility: CLINIC | Age: 71
End: 2021-02-17

## 2021-02-18 LAB
A/G RATIO: 1.6
ALBUMIN: 4.5
ALKALINE PHOSPHATASE: 135
ALT (SGPT): 79
AST (SGOT): 71
BILIRUBIN, TOTAL: 0.5
BUN/CREATININE RATIO: 16
BUN: 15
CALCIUM: 9.9
CARBON DIOXIDE, TOTAL: 25
CHLORIDE: 100
CREATININE: 0.94
EGFR IF AFRICN AM: 71
EGFR IF NONAFRICN AM: 61
GLOBULIN, TOTAL: 2.8
GLUCOSE: 103
POTASSIUM: 5.1
PROTEIN, TOTAL: 7.3
SODIUM: 136

## 2021-02-19 ENCOUNTER — WEB ENCOUNTER (OUTPATIENT)
Dept: URBAN - METROPOLITAN AREA CLINIC 86 | Facility: CLINIC | Age: 71
End: 2021-02-19

## 2021-02-25 ENCOUNTER — OFFICE VISIT (OUTPATIENT)
Dept: URBAN - METROPOLITAN AREA TELEHEALTH 2 | Facility: TELEHEALTH | Age: 71
End: 2021-02-25
Payer: MEDICARE

## 2021-02-25 DIAGNOSIS — R94.5 ABNORMAL LFTS: ICD-10-CM

## 2021-02-25 DIAGNOSIS — E78.5 HYPERLIPIDEMIA: ICD-10-CM

## 2021-02-25 DIAGNOSIS — Z79.899 HIGH RISK MEDICATION USE: ICD-10-CM

## 2021-02-25 DIAGNOSIS — R93.2 ABNORMAL LIVER ULTRASOUND: ICD-10-CM

## 2021-02-25 PROCEDURE — 99213 OFFICE O/P EST LOW 20 MIN: CPT | Performed by: PHYSICIAN ASSISTANT

## 2021-02-25 PROCEDURE — 99215 OFFICE O/P EST HI 40 MIN: CPT | Performed by: PHYSICIAN ASSISTANT

## 2021-02-25 RX ORDER — ACETAMINOPHEN 325 MG/1
1 TABLET AS NEEDED TABLET, FILM COATED ORAL
Refills: 0 | Status: ACTIVE | COMMUNITY
Start: 1900-01-01

## 2021-02-25 RX ORDER — NAPROXEN SODIUM 220 MG/1
1 TABLET WITH FOOD OR MILK AS NEEDED TABLET ORAL
Refills: 0 | Status: ACTIVE | COMMUNITY
Start: 1900-01-01

## 2021-02-25 RX ORDER — LITHIUM CARBONATE 300 MG/1
1 CAPSULE AT BEDTIME CAPSULE, GELATIN COATED ORAL ONCE A DAY
Status: ACTIVE | COMMUNITY

## 2021-02-25 RX ORDER — LEVOMEFOLATE/ALGAL OIL 15-90.314
1 CAPSULE CAPSULE ORAL ONCE A DAY
Status: ACTIVE | COMMUNITY

## 2021-02-25 RX ORDER — CONJUGATED ESTROGENS 0.62 MG/G
EVERY PTHER WEEK CREAM VAGINAL
Refills: 0 | Status: ACTIVE | COMMUNITY
Start: 1900-01-01

## 2021-02-25 RX ORDER — ALBUTEROL SULFATE 90 UG/1
1 PUFF AS NEEDED AEROSOL, METERED RESPIRATORY (INHALATION)
Status: ACTIVE | COMMUNITY

## 2021-02-25 RX ORDER — LORAZEPAM 1 MG/1
TAKE 2 TABLETS (2 MG) BY ORAL ROUTE ONCE DAILY AT BEDTIME AS NEEDED TABLET ORAL 1
Qty: 0 | Refills: 0 | Status: ACTIVE | COMMUNITY
Start: 1900-01-01

## 2021-02-25 NOTE — HPI-TODAY'S VISIT:
This is a scheduled follow-up appointment for this patient, a 70 year old /White female, after a previous visit on Jan 2020 for an evaluation for elevated liver function tests and and hx of DILI.     The patient reports no significant symptoms related to the elevated LFT's.    she will take magnesium glutanate for eyes. she will avoid all other otc mvi.   Feb 2021 u.s: patent vessels seen. Liver mildly coarse appearing to them. Spleen 10.7cm.  Right kidney 10.3cm. No hydronephrosis. Common bile duct 4mm and normal. Pancreas unremarkable. Liver not echogenic prior and so need labs to correlate with this and so please do lfts for us now to see if maybe liver more inflamed? deplin:  looked up re this: Per webmd: "Signs of an allergic reaction, like rash; hives; itching; red, swollen, blistered, or peeling skin with or without fever; wheezing; tightness in the chest or throat; trouble breathing, swallowing, or talking; unusual hoarseness; or swelling of the mouth, face, lips, tongue, or throat. A burning or tingling feeling that is not normal. Swelling. What are some other side effects of this drug? All drugs may cause side effects. However, many people have no side effects or only have minor side effects. Call your doctor or get medical help if any of these side effects or any other side effects bother you or do not go away:     her lfts have improved from june 2020 off tizanidine. still on deplin daily. her alp improved, but ast/alt stable around 70 range.   she has inc lithium dose for the past 2 weeks. she is taking lorezapam daily and liver tox: Hepatotoxicity Lorazepam, as with other benzodiazepines, is rarely associated with serum ALT elevations, and clinically apparent liver injury from lorazepam is extremely rare, if it occurs at all. There have been no case reports of symptomatic, acute liver injury from lorazepam. Cases of clinically apparent hepatitis have been reported with other benzodiazepines including alprazolam, chlordiazepoxide, clonazepam, diazepam, flurazepam and triazolam. The clinical pattern of acute liver injury from benzodiazepines is typically cholestatic and mild-to-moderate in severity with a latency of 1 to 6 months. Fever and rash are not common nor is autoantibody formation.  Likelihood score: E (Unlikely cause of clinically apparent liver injury).  RECAP: Sept 2020  labs MUCH lower ast 72 from 160. alt down to 82 from 188. alk down to 121 from 152 and tb down to 0.3 from 0.5 for the comparison points listed.   Tizanidine I  believe was the medicine in question.  She has been off this July 1 2020.  Prior labs  glu 82 and bun 14 and cr 0.85 and na 139 and k 4.5 and cl 102 and ca 9.8 and alb 4.3 and tb 0.4 and alk 137 and ast 81 and alt 88 and desired  less than 35.   In june 2020 ast 160 and alt 188 and alk 152 so coming down from this and need to track this.    U.s: back: no suspicious lesions. Absent gallbladder. No biie duct dilation. Patent vessels. Spleen 11.5 cm. Right kidney 9.7cm and no hydronephrosis. Pancreas appears normal.  Liver normal echogeicity.    She is to do sleep study for fatigue.  She stopped montelukast prior to christmas 2019 due to skin reaction/palpitations.  She has cervical stenosis and seen neurosurgery and she did have surgery 4-14-20.  She has a lot of sensitivity to meds. Dr. Chadwick Staples.   Zack sent letter of june 22 2020.  Jan 2020 ast 65 and alt 69 and alk 138.  June 2020 ast 160 and alt 188 and alkt 152.  She stopped it July 1, 2020: she spoke with neurosurgeon and also neurologist and did stop off.  Per livertox: Tizanidine: "Transient and asymptomatic elevations in serum ALT greater than 3 times the upper limit of normal occur in ~5% of patients taking tizanidine compared to 0.4% of subjects on placebo. Reports of severe hepatotoxicity, acute liver failure and death have been mentioned in review articles on tizanidine, but few case reports have been published. In these reports, the latency to onset of jaundice has ranged from 2 to 14 weeks and the enzyme elevations have been both cholestatic and hepatocellular (Case 1). Immunoallergic and autoimmune features have not been mentioned. Recovery was complete after 1 to 2 months.  Likelihood score: C (Probable rare cause of clinically apparent liver injury)."

## 2021-03-05 ENCOUNTER — LAB OUTSIDE AN ENCOUNTER (OUTPATIENT)
Dept: URBAN - METROPOLITAN AREA CLINIC 98 | Facility: CLINIC | Age: 71
End: 2021-03-05

## 2021-03-05 ENCOUNTER — OFFICE VISIT (OUTPATIENT)
Dept: URBAN - METROPOLITAN AREA CLINIC 98 | Facility: CLINIC | Age: 71
End: 2021-03-05
Payer: MEDICARE

## 2021-03-05 VITALS
TEMPERATURE: 98 F | DIASTOLIC BLOOD PRESSURE: 70 MMHG | HEART RATE: 65 BPM | BODY MASS INDEX: 23.97 KG/M2 | WEIGHT: 140.4 LBS | SYSTOLIC BLOOD PRESSURE: 144 MMHG | HEIGHT: 64 IN

## 2021-03-05 DIAGNOSIS — F41.9 ANXIETY AND DEPRESSION: ICD-10-CM

## 2021-03-05 DIAGNOSIS — E03.9 HYPOTHYROIDISM: ICD-10-CM

## 2021-03-05 DIAGNOSIS — Z71.89 VACCINE COUNSELING: ICD-10-CM

## 2021-03-05 DIAGNOSIS — K71.9 DRUG INDUCED LIVER DISEASE: ICD-10-CM

## 2021-03-05 DIAGNOSIS — F41.8 ANXIETY ABOUT HEALTH: ICD-10-CM

## 2021-03-05 DIAGNOSIS — E66.3 OVERWEIGHT: ICD-10-CM

## 2021-03-05 DIAGNOSIS — K21.9 GERD: ICD-10-CM

## 2021-03-05 DIAGNOSIS — E78.5 HYPERLIPIDEMIA: ICD-10-CM

## 2021-03-05 DIAGNOSIS — Z79.899 HIGH RISK MEDICATION USE: ICD-10-CM

## 2021-03-05 DIAGNOSIS — R94.5 ABNORMAL LFTS: ICD-10-CM

## 2021-03-05 DIAGNOSIS — Z12.11 COLON CANCER SCREENING: ICD-10-CM

## 2021-03-05 PROCEDURE — G8420 CALC BMI NORM PARAMETERS: HCPCS | Performed by: INTERNAL MEDICINE

## 2021-03-05 PROCEDURE — 3017F COLORECTAL CA SCREEN DOC REV: CPT | Performed by: INTERNAL MEDICINE

## 2021-03-05 PROCEDURE — G9903 PT SCRN TBCO ID AS NON USER: HCPCS | Performed by: INTERNAL MEDICINE

## 2021-03-05 PROCEDURE — 1036F TOBACCO NON-USER: CPT | Performed by: INTERNAL MEDICINE

## 2021-03-05 PROCEDURE — G8482 FLU IMMUNIZE ORDER/ADMIN: HCPCS | Performed by: INTERNAL MEDICINE

## 2021-03-05 PROCEDURE — 99214 OFFICE O/P EST MOD 30 MIN: CPT | Performed by: INTERNAL MEDICINE

## 2021-03-05 PROCEDURE — G8427 DOCREV CUR MEDS BY ELIG CLIN: HCPCS | Performed by: INTERNAL MEDICINE

## 2021-03-05 RX ORDER — SODIUM SULFATE, MAGNESIUM SULFATE, AND POTASSIUM CHLORIDE 17.75; 2.7; 2.25 G/1; G/1; G/1
12 TABLETS TABLET ORAL
Qty: 24 TABLET | Refills: 0 | OUTPATIENT

## 2021-03-05 RX ORDER — ALBUTEROL SULFATE 90 UG/1
1 PUFF AS NEEDED AEROSOL, METERED RESPIRATORY (INHALATION)
Status: ACTIVE | COMMUNITY

## 2021-03-05 RX ORDER — LITHIUM CARBONATE 300 MG/1
1 CAPSULE AT BEDTIME CAPSULE, GELATIN COATED ORAL ONCE A DAY
Status: ACTIVE | COMMUNITY

## 2021-03-05 RX ORDER — LEVOMEFOLATE/ALGAL OIL 15-90.314
1 CAPSULE CAPSULE ORAL ONCE A DAY
Status: ACTIVE | COMMUNITY

## 2021-03-05 RX ORDER — NAPROXEN SODIUM 220 MG/1
1 TABLET WITH FOOD OR MILK AS NEEDED TABLET ORAL
Refills: 0 | Status: ACTIVE | COMMUNITY
Start: 1900-01-01

## 2021-03-05 RX ORDER — LORAZEPAM 1 MG/1
TAKE 2 TABLETS (2 MG) BY ORAL ROUTE ONCE DAILY AT BEDTIME AS NEEDED TABLET ORAL 1
Qty: 0 | Refills: 0 | Status: ACTIVE | COMMUNITY
Start: 1900-01-01

## 2021-03-05 RX ORDER — CONJUGATED ESTROGENS 0.62 MG/G
EVERY PTHER WEEK CREAM VAGINAL
Refills: 0 | Status: ACTIVE | COMMUNITY
Start: 1900-01-01

## 2021-03-05 RX ORDER — ACETAMINOPHEN 325 MG/1
1 TABLET AS NEEDED TABLET, FILM COATED ORAL
Refills: 0 | Status: ACTIVE | COMMUNITY
Start: 1900-01-01

## 2021-03-05 NOTE — HPI-OTHER HISTORIES
f/u visit. She has done very well on Protonix, but curious if she still needs this medicine No dysphagia We reviewed prior EGD 2019 with some mild esophagitis . Due for colonoscopy for FHx CRC Reveiewd last colonoscopy 2014 . Following with liver center for elevated liver enzymes. Reviewed recent US with some nodular contour of the liver.  She is supposed to have MRI for further evaluation.  We reviewed prior liver biopsies at [place] with some inflammation seen in 2013 but no fibrosis.  She is concerned about the new US findings, and is wondering about getting the MRI done.  Reports recent labs with Dr. Kelley, but unsure of results.   . PRIOR VISIT: She had EGD 9/2019 with esophagitis On Protonix 20mg daily Followed in liver center for elevated LFTs, thought to be medicaiton induced based on prior biopsies Today she reports doing well on the Protonix 20mg daily She is cusious about colonoscopy. Last colon 2014 without polpys. No h/o colon polyps, but does have Fhx CRC in her father. . 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 She is currently seeing a [facility] therapist to improve her voice. Since the last visit the patient is doing well. Denies having any abdominal pain, nausea, vomiting, diarrhea, constipation, rectal bleeding or melena. Appetite is fine and weight is stable.No heartburn, regurgitation dysphagia. No throat clearing or persistent coughing.There are no new complaints. The present regimen is being followed.

## 2021-03-09 ENCOUNTER — TELEPHONE ENCOUNTER (OUTPATIENT)
Dept: URBAN - METROPOLITAN AREA CLINIC 98 | Facility: CLINIC | Age: 71
End: 2021-03-09

## 2021-03-10 ENCOUNTER — TELEPHONE ENCOUNTER (OUTPATIENT)
Dept: URBAN - METROPOLITAN AREA CLINIC 98 | Facility: CLINIC | Age: 71
End: 2021-03-10

## 2021-03-16 ENCOUNTER — TELEPHONE ENCOUNTER (OUTPATIENT)
Dept: URBAN - METROPOLITAN AREA CLINIC 98 | Facility: CLINIC | Age: 71
End: 2021-03-16

## 2021-03-17 ENCOUNTER — WEB ENCOUNTER (OUTPATIENT)
Dept: URBAN - METROPOLITAN AREA CLINIC 98 | Facility: CLINIC | Age: 71
End: 2021-03-17

## 2021-04-07 ENCOUNTER — TELEPHONE ENCOUNTER (OUTPATIENT)
Dept: URBAN - METROPOLITAN AREA CLINIC 6 | Facility: CLINIC | Age: 71
End: 2021-04-07

## 2021-04-22 ENCOUNTER — OFFICE VISIT (OUTPATIENT)
Dept: URBAN - METROPOLITAN AREA TELEHEALTH 2 | Facility: TELEHEALTH | Age: 71
End: 2021-04-22

## 2021-09-08 ENCOUNTER — TELEPHONE ENCOUNTER (OUTPATIENT)
Dept: URBAN - METROPOLITAN AREA CLINIC 98 | Facility: CLINIC | Age: 71
End: 2021-09-08

## 2021-09-24 ENCOUNTER — OFFICE VISIT (OUTPATIENT)
Dept: URBAN - METROPOLITAN AREA MEDICAL CENTER 28 | Facility: MEDICAL CENTER | Age: 71
End: 2021-09-24
Payer: MEDICARE

## 2021-09-24 DIAGNOSIS — K63.89 BACTERIAL OVERGROWTH SYNDROME: ICD-10-CM

## 2021-09-24 DIAGNOSIS — D12.2 ADENOMA OF ASCENDING COLON: ICD-10-CM

## 2021-09-24 DIAGNOSIS — D12.0 ADENOMA OF CECUM: ICD-10-CM

## 2021-09-24 DIAGNOSIS — Z12.11 AVERAGE RISK FOR CRC. DUE TO PT'S CO-MORBID STATE WITH END STAGE DEMENTIA, HIGH RISK FOR ANESTHESIA (PER NEUROLOGY); INABILITY TO TAKE A BOWEL PREP....WOULD NOT ADVISE ANY COLORECTAL CANCER SCREENING INCLUDING STOOL TEST FOR FECAL BLOOD.: ICD-10-CM

## 2021-09-24 PROCEDURE — 45380 COLONOSCOPY AND BIOPSY: CPT | Performed by: INTERNAL MEDICINE

## 2021-09-24 PROCEDURE — 45385 COLONOSCOPY W/LESION REMOVAL: CPT | Performed by: INTERNAL MEDICINE

## 2021-09-24 RX ORDER — CONJUGATED ESTROGENS 0.62 MG/G
EVERY PTHER WEEK CREAM VAGINAL
Refills: 0 | Status: ACTIVE | COMMUNITY
Start: 1900-01-01

## 2021-09-24 RX ORDER — ALBUTEROL SULFATE 90 UG/1
1 PUFF AS NEEDED AEROSOL, METERED RESPIRATORY (INHALATION)
Status: ACTIVE | COMMUNITY

## 2021-09-24 RX ORDER — LORAZEPAM 1 MG/1
TAKE 2 TABLETS (2 MG) BY ORAL ROUTE ONCE DAILY AT BEDTIME AS NEEDED TABLET ORAL 1
Qty: 0 | Refills: 0 | Status: ACTIVE | COMMUNITY
Start: 1900-01-01

## 2021-09-24 RX ORDER — LEVOMEFOLATE/ALGAL OIL 15-90.314
1 CAPSULE CAPSULE ORAL ONCE A DAY
Status: ACTIVE | COMMUNITY

## 2021-09-24 RX ORDER — ACETAMINOPHEN 325 MG/1
1 TABLET AS NEEDED TABLET, FILM COATED ORAL
Refills: 0 | Status: ACTIVE | COMMUNITY
Start: 1900-01-01

## 2021-09-24 RX ORDER — LITHIUM CARBONATE 300 MG/1
1 CAPSULE AT BEDTIME CAPSULE, GELATIN COATED ORAL ONCE A DAY
Status: ACTIVE | COMMUNITY

## 2021-09-24 RX ORDER — SODIUM SULFATE, MAGNESIUM SULFATE, AND POTASSIUM CHLORIDE 17.75; 2.7; 2.25 G/1; G/1; G/1
12 TABLETS TABLET ORAL
Qty: 24 TABLET | Refills: 0 | Status: ACTIVE | COMMUNITY

## 2021-09-24 RX ORDER — NAPROXEN SODIUM 220 MG/1
1 TABLET WITH FOOD OR MILK AS NEEDED TABLET ORAL
Refills: 0 | Status: ACTIVE | COMMUNITY
Start: 1900-01-01

## 2021-10-05 ENCOUNTER — TELEPHONE ENCOUNTER (OUTPATIENT)
Dept: URBAN - METROPOLITAN AREA CLINIC 98 | Facility: CLINIC | Age: 71
End: 2021-10-05

## 2022-03-11 ENCOUNTER — TELEPHONE ENCOUNTER (OUTPATIENT)
Dept: URBAN - METROPOLITAN AREA CLINIC 98 | Facility: CLINIC | Age: 72
End: 2022-03-11

## 2023-01-24 ENCOUNTER — CLAIMS CREATED FROM THE CLAIM WINDOW (OUTPATIENT)
Dept: URBAN - METROPOLITAN AREA CLINIC 98 | Facility: CLINIC | Age: 73
End: 2023-01-24
Payer: MEDICARE

## 2023-01-24 VITALS
WEIGHT: 143 LBS | SYSTOLIC BLOOD PRESSURE: 120 MMHG | TEMPERATURE: 97.2 F | HEART RATE: 81 BPM | DIASTOLIC BLOOD PRESSURE: 69 MMHG | BODY MASS INDEX: 24.41 KG/M2 | HEIGHT: 64 IN

## 2023-01-24 DIAGNOSIS — E66.3 OVERWEIGHT: ICD-10-CM

## 2023-01-24 DIAGNOSIS — F41.9 ANXIETY AND DEPRESSION: ICD-10-CM

## 2023-01-24 DIAGNOSIS — K21.9 GERD: ICD-10-CM

## 2023-01-24 DIAGNOSIS — Z12.11 COLON CANCER SCREENING: ICD-10-CM

## 2023-01-24 DIAGNOSIS — Z71.89 VACCINE COUNSELING: ICD-10-CM

## 2023-01-24 DIAGNOSIS — Z86.010 PERSONAL HISTORY OF COLONIC POLYPS: ICD-10-CM

## 2023-01-24 DIAGNOSIS — R94.5 ABNORMAL LFTS: ICD-10-CM

## 2023-01-24 DIAGNOSIS — E78.5 HYPERLIPIDEMIA: ICD-10-CM

## 2023-01-24 DIAGNOSIS — E03.9 HYPOTHYROIDISM: ICD-10-CM

## 2023-01-24 DIAGNOSIS — R19.4 CHANGE IN BOWEL HABITS: ICD-10-CM

## 2023-01-24 DIAGNOSIS — R74.8 ABNORMAL LIVER ENZYMES: ICD-10-CM

## 2023-01-24 DIAGNOSIS — E03.9 HYPOTHYROIDISM, UNSPECIFIED TYPE: ICD-10-CM

## 2023-01-24 DIAGNOSIS — Z98.890 HISTORY OF COLONOSCOPY: ICD-10-CM

## 2023-01-24 DIAGNOSIS — Z79.899 HIGH RISK MEDICATION USE: ICD-10-CM

## 2023-01-24 DIAGNOSIS — K71.9 DRUG INDUCED LIVER DISEASE: ICD-10-CM

## 2023-01-24 DIAGNOSIS — K21.0 REFLUX ESOPHAGITIS: ICD-10-CM

## 2023-01-24 DIAGNOSIS — R93.2 ABNORMAL LIVER ULTRASOUND: ICD-10-CM

## 2023-01-24 PROBLEM — 166643006: Status: ACTIVE | Noted: 2021-03-05

## 2023-01-24 PROBLEM — 428283002: Status: ACTIVE | Noted: 2023-01-24

## 2023-01-24 PROBLEM — 40930008: Status: ACTIVE | Noted: 2020-07-25

## 2023-01-24 PROBLEM — 235595009 GASTROESOPHAGEAL REFLUX DISEASE: Status: ACTIVE | Noted: 2021-03-05

## 2023-01-24 PROBLEM — 427399008: Status: ACTIVE | Noted: 2020-07-25

## 2023-01-24 PROBLEM — 851000119109: Status: ACTIVE | Noted: 2020-07-25

## 2023-01-24 PROBLEM — 266433003: Status: ACTIVE | Noted: 2020-07-25

## 2023-01-24 PROBLEM — 88111009: Status: ACTIVE | Noted: 2023-01-24

## 2023-01-24 PROCEDURE — G8427 DOCREV CUR MEDS BY ELIG CLIN: HCPCS | Performed by: INTERNAL MEDICINE

## 2023-01-24 PROCEDURE — G9903 PT SCRN TBCO ID AS NON USER: HCPCS | Performed by: INTERNAL MEDICINE

## 2023-01-24 PROCEDURE — 99213 OFFICE O/P EST LOW 20 MIN: CPT | Performed by: INTERNAL MEDICINE

## 2023-01-24 PROCEDURE — G8420 CALC BMI NORM PARAMETERS: HCPCS | Performed by: INTERNAL MEDICINE

## 2023-01-24 PROCEDURE — 3017F COLORECTAL CA SCREEN DOC REV: CPT | Performed by: INTERNAL MEDICINE

## 2023-01-24 PROCEDURE — G8482 FLU IMMUNIZE ORDER/ADMIN: HCPCS | Performed by: INTERNAL MEDICINE

## 2023-01-24 RX ORDER — ALBUTEROL SULFATE 90 UG/1
1 PUFF AS NEEDED AEROSOL, METERED RESPIRATORY (INHALATION)
Status: ACTIVE | COMMUNITY

## 2023-01-24 RX ORDER — NAPROXEN SODIUM 220 MG/1
1 TABLET WITH FOOD OR MILK AS NEEDED TABLET ORAL
Refills: 0 | Status: ACTIVE | COMMUNITY
Start: 1900-01-01

## 2023-01-24 RX ORDER — LORAZEPAM 1 MG/1
TAKE 2 TABLETS (2 MG) BY ORAL ROUTE ONCE DAILY AT BEDTIME AS NEEDED TABLET ORAL 1
Qty: 0 | Refills: 0 | Status: ACTIVE | COMMUNITY
Start: 1900-01-01

## 2023-01-24 RX ORDER — LEVOMEFOLATE/ALGAL OIL 15-90.314
1 CAPSULE CAPSULE ORAL ONCE A DAY
Status: ACTIVE | COMMUNITY

## 2023-01-24 RX ORDER — ACETAMINOPHEN 325 MG/1
1 TABLET AS NEEDED TABLET, FILM COATED ORAL
Refills: 0 | Status: ACTIVE | COMMUNITY
Start: 1900-01-01

## 2023-01-24 RX ORDER — CITALOPRAM 10 MG/1
TABLET, FILM COATED ORAL
Qty: 30 TABLET | Status: ACTIVE | COMMUNITY

## 2023-01-24 RX ORDER — LITHIUM CARBONATE 300 MG/1
1 CAPSULE AT BEDTIME CAPSULE, GELATIN COATED ORAL ONCE A DAY
Status: ACTIVE | COMMUNITY

## 2023-01-24 RX ORDER — CONJUGATED ESTROGENS 0.62 MG/G
EVERY PTHER WEEK CREAM VAGINAL
Refills: 0 | Status: ACTIVE | COMMUNITY
Start: 1900-01-01

## 2023-01-24 NOTE — HPI-OTHER HISTORIES
f/u visit. Last seen 2021.  Following with [facility] with fibroscan and appointment coming up 4/2023 Colonoscopy 2021 with SSA removed Last fall she developed irregular bowel movements.  Small, pellet like stools vs regular stools she used to have She has had 2 episodes of incontinence over past few months Reports some significnat back issues Only new medication is low dose citalopram 2mo ago Denies straining with bowel movements.   . PRIOR VISIT: She has done very well on Protonix, but curious if she still needs this medicine No dysphagia We reviewed prior EGD 2019 with some mild esophagitis . Due for colonoscopy for FHx CRC Reveiewd last colonoscopy 2014 . Following with liver center for elevated liver enzymes. Reviewed recent US with some nodular contour of the liver.  She is supposed to have MRI for further evaluation.  We reviewed prior liver biopsies at [place] with some inflammation seen in 2013 but no fibrosis.  She is concerned about the new US findings, and is wondering about getting the MRI done.  Reports recent labs with Dr. Kelley, but unsure of results.   . PRIOR VISIT: She had EGD 9/2019 with esophagitis On Protonix 20mg daily Followed in liver center for elevated LFTs, thought to be medicaiton induced based on prior biopsies Today she reports doing well on the Protonix 20mg daily She is cusious about colonoscopy. Last colon 2014 without polpys. No h/o colon polyps, but does have Fhx CRC in her father. . 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 She is currently seeing a [facility] therapist to improve her voice. Since the last visit the patient is doing well. Denies having any abdominal pain, nausea, vomiting, diarrhea, constipation, rectal bleeding or melena. Appetite is fine and weight is stable.No heartburn, regurgitation dysphagia. No throat clearing or persistent coughing.There are no new complaints. The present regimen is being followed.

## 2023-03-08 ENCOUNTER — TELEPHONE ENCOUNTER (OUTPATIENT)
Dept: URBAN - METROPOLITAN AREA CLINIC 98 | Facility: CLINIC | Age: 73
End: 2023-03-08

## 2023-07-18 ENCOUNTER — DASHBOARD ENCOUNTERS (OUTPATIENT)
Age: 73
End: 2023-07-18

## 2023-07-18 ENCOUNTER — OFFICE VISIT (OUTPATIENT)
Dept: URBAN - METROPOLITAN AREA CLINIC 98 | Facility: CLINIC | Age: 73
End: 2023-07-18
Payer: MEDICARE

## 2023-07-18 ENCOUNTER — LAB OUTSIDE AN ENCOUNTER (OUTPATIENT)
Dept: URBAN - METROPOLITAN AREA CLINIC 98 | Facility: CLINIC | Age: 73
End: 2023-07-18

## 2023-07-18 VITALS
SYSTOLIC BLOOD PRESSURE: 97 MMHG | WEIGHT: 148.2 LBS | HEART RATE: 77 BPM | TEMPERATURE: 97.3 F | BODY MASS INDEX: 25.3 KG/M2 | DIASTOLIC BLOOD PRESSURE: 57 MMHG | HEIGHT: 64 IN

## 2023-07-18 DIAGNOSIS — E66.3 OVERWEIGHT: ICD-10-CM

## 2023-07-18 DIAGNOSIS — R19.4 CHANGE IN BOWEL HABITS: ICD-10-CM

## 2023-07-18 DIAGNOSIS — K71.9 DRUG INDUCED LIVER DISEASE: ICD-10-CM

## 2023-07-18 DIAGNOSIS — K21.9 GERD: ICD-10-CM

## 2023-07-18 PROBLEM — 19943007: Status: ACTIVE | Noted: 2023-07-18

## 2023-07-18 PROBLEM — 408335007: Status: ACTIVE | Noted: 2023-07-18

## 2023-07-18 PROCEDURE — 99214 OFFICE O/P EST MOD 30 MIN: CPT | Performed by: INTERNAL MEDICINE

## 2023-07-18 RX ORDER — LEVOMEFOLATE/ALGAL OIL 15-90.314
1 CAPSULE CAPSULE ORAL ONCE A DAY
Status: ACTIVE | COMMUNITY

## 2023-07-18 RX ORDER — LORAZEPAM 1 MG/1
TAKE 2 TABLETS (2 MG) BY ORAL ROUTE ONCE DAILY AT BEDTIME AS NEEDED TABLET ORAL 1
Qty: 0 | Refills: 0 | Status: ACTIVE | COMMUNITY
Start: 1900-01-01

## 2023-07-18 RX ORDER — ALBUTEROL SULFATE 90 UG/1
1 PUFF AS NEEDED AEROSOL, METERED RESPIRATORY (INHALATION)
Status: ACTIVE | COMMUNITY

## 2023-07-18 RX ORDER — LITHIUM CARBONATE 300 MG/1
1 CAPSULE AT BEDTIME CAPSULE, GELATIN COATED ORAL ONCE A DAY
Status: ACTIVE | COMMUNITY

## 2023-07-18 RX ORDER — CITALOPRAM 10 MG/1
TABLET, FILM COATED ORAL
Qty: 30 TABLET | Status: ACTIVE | COMMUNITY

## 2023-07-18 RX ORDER — ACETAMINOPHEN 325 MG/1
1 TABLET AS NEEDED TABLET, FILM COATED ORAL
Refills: 0 | Status: ACTIVE | COMMUNITY
Start: 1900-01-01

## 2023-07-18 NOTE — HPI-OTHER HISTORIES
f/u visit. Since last visit, she was diagnosed with cirrhosis and suspect AIH by liver biopsy 4/2023 Needs EGD for screening  . PRIOR VISIT: Last seen 2021.  Following with [facility] with fibroscan and appointment coming up 4/2023 Colonoscopy 2021 with SSA removed Last fall she developed irregular bowel movements.  Small, pellet like stools vs regular stools she used to have She has had 2 episodes of incontinence over past few months Reports some significnat back issues Only new medication is low dose citalopram 2mo ago Denies straining with bowel movements.   . PRIOR VISIT: She has done very well on Protonix, but curious if she still needs this medicine No dysphagia We reviewed prior EGD 2019 with some mild esophagitis . Due for colonoscopy for FHx CRC Reveiewd last colonoscopy 2014 . Following with [facility] center for elevated liver enzymes. Reviewed recent US with some nodular contour of the liver.  She is supposed to have MRI for further evaluation.  We reviewed prior liver biopsies at [place] with some inflammation seen in 2013 but no fibrosis.  She is concerned about the new US findings, and is wondering about getting the MRI done.  Reports recent labs with Dr. Kelley, but unsure of results.   . PRIOR VISIT: She had EGD 9/2019 with esophagitis On Protonix 20mg daily Followed in liver center for elevated LFTs, thought to be medicaiton induced based on prior biopsies Today she reports doing well on the Protonix 20mg daily She is cusious about colonoscopy. Last colon 2014 without polpys. No h/o colon polyps, but does have Fhx CRC in her father. . 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 She is currently seeing a [facility] therapist to improve her voice. Since the last visit the patient is doing well. Denies having any abdominal pain, nausea, vomiting, diarrhea, constipation, rectal bleeding or melena. Appetite is fine and weight is stable.No heartburn, regurgitation dysphagia. No throat clearing or persistent coughing.There are no new complaints. The present regimen is being followed. f/u visit. Last seen 2021.  Following with [facility] with fibroscan and appointment coming up 4/2023 Colonoscopy 2021 with SSA removed Last fall she developed irregular bowel movements.  Small, pellet like stools vs regular stools she used to have She has had 2 episodes of incontinence over past few months Reports some significnat back issues Only new medication is low dose citalopram 2mo ago Denies straining with bowel movements.   . PRIOR VISIT: She has done very well on Protonix, but curious if she still needs this medicine No dysphagia We reviewed prior EGD 2019 with some mild esophagitis . Due for colonoscopy for FHx CRC Reveiewd last colonoscopy 2014 . Following with liver center for elevated liver enzymes. Reviewed recent US with some nodular contour of the liver.  She is supposed to have MRI for further evaluation.  We reviewed prior liver biopsies at [place] with some inflammation seen in 2013 but no fibrosis.  She is concerned about the new US findings, and is wondering about getting the MRI done.  Reports recent labs with Dr. Kelley, but unsure of results.   . PRIOR VISIT: She had EGD 9/2019 with esophagitis On Protonix 20mg daily Followed in liver center for elevated LFTs, thought to be medicaiton induced based on prior biopsies Today she reports doing well on the Protonix 20mg daily She is cusious about colonoscopy. Last colon 2014 without polpys. No h/o colon polyps, but does have Fhx CRC in her father. . 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 She is currently seeing a [facility] therapist to improve her voice. Since the last visit the patient is doing well. Denies having any abdominal pain, nausea, vomiting, diarrhea, constipation, rectal bleeding or melena. Appetite is fine and weight is stable.No heartburn, regurgitation dysphagia. No throat clearing or persistent coughing.There are no new complaints. The present regimen is being followed.

## 2023-07-28 ENCOUNTER — OFFICE VISIT (OUTPATIENT)
Dept: URBAN - METROPOLITAN AREA MEDICAL CENTER 28 | Facility: MEDICAL CENTER | Age: 73
End: 2023-07-28

## 2023-08-03 ENCOUNTER — WEB ENCOUNTER (OUTPATIENT)
Dept: URBAN - METROPOLITAN AREA CLINIC 84 | Facility: CLINIC | Age: 73
End: 2023-08-03

## 2023-10-13 ENCOUNTER — OFFICE VISIT (OUTPATIENT)
Dept: URBAN - METROPOLITAN AREA MEDICAL CENTER 28 | Facility: MEDICAL CENTER | Age: 73
End: 2023-10-13
Payer: MEDICARE

## 2023-10-13 DIAGNOSIS — K74.60 ADVANCED CIRRHOSIS: ICD-10-CM

## 2023-10-13 DIAGNOSIS — K31.89 ACHYLIA: ICD-10-CM

## 2023-10-13 PROCEDURE — 43239 EGD BIOPSY SINGLE/MULTIPLE: CPT | Performed by: INTERNAL MEDICINE

## 2023-10-13 RX ORDER — ACETAMINOPHEN 325 MG/1
1 TABLET AS NEEDED TABLET, FILM COATED ORAL
Refills: 0 | Status: ACTIVE | COMMUNITY
Start: 1900-01-01

## 2023-10-13 RX ORDER — CITALOPRAM 10 MG/1
TABLET, FILM COATED ORAL
Qty: 30 TABLET | Status: ACTIVE | COMMUNITY

## 2023-10-13 RX ORDER — ALBUTEROL SULFATE 90 UG/1
1 PUFF AS NEEDED AEROSOL, METERED RESPIRATORY (INHALATION)
Status: ACTIVE | COMMUNITY

## 2023-10-13 RX ORDER — LEVOMEFOLATE/ALGAL OIL 15-90.314
1 CAPSULE CAPSULE ORAL ONCE A DAY
Status: ACTIVE | COMMUNITY

## 2023-10-13 RX ORDER — PANTOPRAZOLE SODIUM 40 MG/1
1 TABLET TABLET, DELAYED RELEASE ORAL ONCE A DAY
Qty: 90 | Refills: 3 | OUTPATIENT
Start: 2023-10-13

## 2023-10-13 RX ORDER — LORAZEPAM 1 MG/1
TAKE 2 TABLETS (2 MG) BY ORAL ROUTE ONCE DAILY AT BEDTIME AS NEEDED TABLET ORAL 1
Qty: 0 | Refills: 0 | Status: ACTIVE | COMMUNITY
Start: 1900-01-01

## 2023-10-13 RX ORDER — LITHIUM CARBONATE 300 MG/1
1 CAPSULE AT BEDTIME CAPSULE, GELATIN COATED ORAL ONCE A DAY
Status: ACTIVE | COMMUNITY

## 2024-01-30 ENCOUNTER — TELEPHONE ENCOUNTER (OUTPATIENT)
Dept: URBAN - METROPOLITAN AREA CLINIC 98 | Facility: CLINIC | Age: 74
End: 2024-01-30

## 2024-02-01 PROBLEM — 14223005: Status: ACTIVE | Noted: 2024-02-01

## 2024-02-09 ENCOUNTER — EGD (OUTPATIENT)
Dept: URBAN - METROPOLITAN AREA MEDICAL CENTER 28 | Facility: MEDICAL CENTER | Age: 74
End: 2024-02-09
Payer: MEDICARE

## 2024-02-09 DIAGNOSIS — K76.6 CLINICALLY SIGNIFICANT PORTAL HYPERTENSION: ICD-10-CM

## 2024-02-09 PROCEDURE — 43235 EGD DIAGNOSTIC BRUSH WASH: CPT | Performed by: INTERNAL MEDICINE

## 2024-02-09 RX ORDER — LITHIUM CARBONATE 300 MG/1
1 CAPSULE AT BEDTIME CAPSULE, GELATIN COATED ORAL ONCE A DAY
Status: ACTIVE | COMMUNITY

## 2024-02-09 RX ORDER — LORAZEPAM 1 MG/1
TAKE 2 TABLETS (2 MG) BY ORAL ROUTE ONCE DAILY AT BEDTIME AS NEEDED TABLET ORAL 1
Qty: 0 | Refills: 0 | Status: ACTIVE | COMMUNITY
Start: 1900-01-01

## 2024-02-09 RX ORDER — ALBUTEROL SULFATE 90 UG/1
1 PUFF AS NEEDED AEROSOL, METERED RESPIRATORY (INHALATION)
Status: ACTIVE | COMMUNITY

## 2024-02-09 RX ORDER — ACETAMINOPHEN 325 MG/1
1 TABLET AS NEEDED TABLET, FILM COATED ORAL
Refills: 0 | Status: ACTIVE | COMMUNITY
Start: 1900-01-01

## 2024-02-09 RX ORDER — CITALOPRAM 10 MG/1
TABLET, FILM COATED ORAL
Qty: 30 TABLET | Status: ACTIVE | COMMUNITY

## 2024-02-09 RX ORDER — LEVOMEFOLATE/ALGAL OIL 15-90.314
1 CAPSULE CAPSULE ORAL ONCE A DAY
Status: ACTIVE | COMMUNITY

## 2024-02-09 RX ORDER — PANTOPRAZOLE SODIUM 40 MG/1
1 TABLET TABLET, DELAYED RELEASE ORAL ONCE A DAY
Qty: 90 | Refills: 3 | Status: ACTIVE | COMMUNITY
Start: 2023-10-13

## 2024-02-20 ENCOUNTER — OV EP (OUTPATIENT)
Dept: URBAN - METROPOLITAN AREA CLINIC 98 | Facility: CLINIC | Age: 74
End: 2024-02-20

## 2024-10-30 ENCOUNTER — WEB ENCOUNTER (OUTPATIENT)
Dept: URBAN - METROPOLITAN AREA CLINIC 98 | Facility: CLINIC | Age: 74
End: 2024-10-30

## 2024-10-30 RX ORDER — PANTOPRAZOLE SODIUM 40 MG/1
1 TABLET TABLET, DELAYED RELEASE ORAL ONCE A DAY
Qty: 90 | Refills: 3
Start: 2023-10-13

## 2024-11-01 ENCOUNTER — TELEPHONE ENCOUNTER (OUTPATIENT)
Dept: URBAN - METROPOLITAN AREA CLINIC 96 | Facility: CLINIC | Age: 74
End: 2024-11-01

## 2024-11-12 ENCOUNTER — OFFICE VISIT (OUTPATIENT)
Dept: URBAN - METROPOLITAN AREA CLINIC 98 | Facility: CLINIC | Age: 74
End: 2024-11-12
Payer: MEDICARE

## 2024-11-12 VITALS
BODY MASS INDEX: 28.17 KG/M2 | HEART RATE: 74 BPM | WEIGHT: 165 LBS | HEIGHT: 64 IN | SYSTOLIC BLOOD PRESSURE: 127 MMHG | DIASTOLIC BLOOD PRESSURE: 78 MMHG | TEMPERATURE: 97.1 F

## 2024-11-12 DIAGNOSIS — K74.60 CIRRHOSIS OF LIVER WITHOUT ASCITES, UNSPECIFIED HEPATIC CIRRHOSIS TYPE: ICD-10-CM

## 2024-11-12 DIAGNOSIS — K21.9 GASTROESOPHAGEAL REFLUX DISEASE, UNSPECIFIED WHETHER ESOPHAGITIS PRESENT: ICD-10-CM

## 2024-11-12 DIAGNOSIS — R94.5 ABNORMAL LFTS: ICD-10-CM

## 2024-11-12 DIAGNOSIS — Z71.89 VACCINE COUNSELING: ICD-10-CM

## 2024-11-12 DIAGNOSIS — R93.2 ABNORMAL LIVER ULTRASOUND: ICD-10-CM

## 2024-11-12 DIAGNOSIS — K21.9 GERD: ICD-10-CM

## 2024-11-12 DIAGNOSIS — E03.9 HYPOTHYROIDISM: ICD-10-CM

## 2024-11-12 DIAGNOSIS — K75.4 AUTOIMMUNE HEPATITIS: ICD-10-CM

## 2024-11-12 DIAGNOSIS — E03.9 HYPOTHYROIDISM, UNSPECIFIED TYPE: ICD-10-CM

## 2024-11-12 DIAGNOSIS — K21.0 REFLUX ESOPHAGITIS: ICD-10-CM

## 2024-11-12 DIAGNOSIS — Z79.899 HIGH RISK MEDICATION USE: ICD-10-CM

## 2024-11-12 DIAGNOSIS — Z86.0101 PERSONAL HISTORY OF ADENOMATOUS AND SERRATED COLON POLYPS: ICD-10-CM

## 2024-11-12 DIAGNOSIS — Z12.11 COLON CANCER SCREENING: ICD-10-CM

## 2024-11-12 DIAGNOSIS — E66.3 OVERWEIGHT: ICD-10-CM

## 2024-11-12 DIAGNOSIS — E78.5 HYPERLIPIDEMIA: ICD-10-CM

## 2024-11-12 DIAGNOSIS — K71.9 DRUG INDUCED LIVER DISEASE: ICD-10-CM

## 2024-11-12 DIAGNOSIS — Z98.890 HISTORY OF COLONOSCOPY: ICD-10-CM

## 2024-11-12 DIAGNOSIS — R74.8 ABNORMAL LIVER ENZYMES: ICD-10-CM

## 2024-11-12 DIAGNOSIS — F41.9 ANXIETY AND DEPRESSION: ICD-10-CM

## 2024-11-12 DIAGNOSIS — I85.10 SECONDARY ESOPHAGEAL VARICES WITHOUT BLEEDING: ICD-10-CM

## 2024-11-12 DIAGNOSIS — R19.4 CHANGE IN BOWEL HABITS: ICD-10-CM

## 2024-11-12 PROCEDURE — 99214 OFFICE O/P EST MOD 30 MIN: CPT | Performed by: INTERNAL MEDICINE

## 2024-11-12 RX ORDER — ALBUTEROL SULFATE 90 UG/1
1 PUFF AS NEEDED AEROSOL, METERED RESPIRATORY (INHALATION)
Status: ACTIVE | COMMUNITY

## 2024-11-12 RX ORDER — PREDNISONE 10 MG/1
TABLET ORAL
Qty: 180 TABLET | Status: ACTIVE | COMMUNITY

## 2024-11-12 RX ORDER — LORAZEPAM 1 MG/1
TAKE 2 TABLETS (2 MG) BY ORAL ROUTE ONCE DAILY AT BEDTIME AS NEEDED TABLET ORAL 1
Qty: 0 | Refills: 0 | Status: ACTIVE | COMMUNITY
Start: 1900-01-01

## 2024-11-12 RX ORDER — LEVOMEFOLATE/ALGAL OIL 15-90.314
1 CAPSULE CAPSULE ORAL ONCE A DAY
Status: ACTIVE | COMMUNITY

## 2024-11-12 RX ORDER — PANTOPRAZOLE SODIUM 40 MG/1
1 TABLET TABLET, DELAYED RELEASE ORAL ONCE A DAY
Qty: 90 | Refills: 3 | Status: ACTIVE | COMMUNITY
Start: 2023-10-13

## 2024-11-12 RX ORDER — LITHIUM CARBONATE 300 MG/1
1 CAPSULE AT BEDTIME CAPSULE, GELATIN COATED ORAL ONCE A DAY
Status: ACTIVE | COMMUNITY

## 2024-11-12 RX ORDER — ACETAMINOPHEN 325 MG/1
1 TABLET AS NEEDED TABLET, FILM COATED ORAL
Refills: 0 | Status: ACTIVE | COMMUNITY
Start: 1900-01-01

## 2024-11-12 RX ORDER — CITALOPRAM 10 MG/1
TABLET, FILM COATED ORAL
Qty: 30 TABLET | Status: ACTIVE | COMMUNITY

## 2024-11-12 RX ORDER — PANTOPRAZOLE SODIUM 40 MG/1
1 TABLET TABLET, DELAYED RELEASE ORAL ONCE A DAY
Qty: 90 | Refills: 3 | OUTPATIENT

## 2024-11-12 NOTE — HPI-OTHER HISTORIES
f/u visit. Following with Dr. Hyatt EGD 2/2024 benign with no varices Due for colonoscopy for polyps 2021  . PRIOR VISIT: Since last visit, she was diagnosed with cirrhosis and suspect AIH by liver biopsy 4/2023 Needs EGD for screening  . PRIOR VISIT: Last seen 2021.  Following with [facility] with fibroscan and appointment coming up 4/2023 Colonoscopy 2021 with SSA removed Last fall she developed irregular bowel movements.  Small, pellet like stools vs regular stools she used to have She has had 2 episodes of incontinence over past few months Reports some significnat back issues Only new medication is low dose citalopram 2mo ago Denies straining with bowel movements.   . PRIOR VISIT: She has done very well on Protonix, but curious if she still needs this medicine No dysphagia We reviewed prior EGD 2019 with some mild esophagitis . Due for colonoscopy for FHx CRC Reveiewd last colonoscopy 2014 . Following with [facility] for elevated liver enzymes. Reviewed recent US with some nodular contour of the liver.  She is supposed to have MRI for further evaluation.  We reviewed prior liver biopsies at [place] with some inflammation seen in 2013 but no fibrosis.  She is concerned about the new US findings, and is wondering about getting the MRI done.  Reports recent labs with Dr. Kelley, but unsure of results.   . PRIOR VISIT: She had EGD 9/2019 with esophagitis On Protonix 20mg daily Followed in liver center for elevated LFTs, thought to be medicaiton induced based on prior biopsies Today she reports doing well on the Protonix 20mg daily She is cusious about colonoscopy. Last colon 2014 without polpys. No h/o colon polyps, but does have Fhx CRC in her father. . 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 She is currently seeing a [facility] therapist to improve her voice. Since the last visit the patient is doing well. Denies having any abdominal pain, nausea, vomiting, diarrhea, constipation, rectal bleeding or melena. Appetite is fine and weight is stable.No heartburn, regurgitation dysphagia. No throat clearing or persistent coughing.There are no new complaints. The present regimen is being followed. f/u visit. Last seen 2021.  Following with [facility] with fibroscan and appointment coming up 4/2023 Colonoscopy 2021 with SSA removed Last fall she developed irregular bowel movements.  Small, pellet like stools vs regular stools she used to have She has had 2 episodes of incontinence over past few months Reports some significnat back issues Only new medication is low dose citalopram 2mo ago Denies straining with bowel movements.   . PRIOR VISIT: She has done very well on Protonix, but curious if she still needs this medicine No dysphagia We reviewed prior EGD 2019 with some mild esophagitis . Due for colonoscopy for FHx CRC Reveiewd last colonoscopy 2014 . Following with liver center for elevated liver enzymes. Reviewed recent US with some nodular contour of the liver.  She is supposed to have MRI for further evaluation.  We reviewed prior liver biopsies at [place] with some inflammation seen in 2013 but no fibrosis.  She is concerned about the new US findings, and is wondering about getting the MRI done.  Reports recent labs with Dr. Kelley, but unsure of results.   . PRIOR VISIT: She had EGD 9/2019 with esophagitis On Protonix 20mg daily Followed in liver center for elevated LFTs, thought to be medicaiton induced based on prior biopsies Today she reports doing well on the Protonix 20mg daily She is cusious about colonoscopy. Last colon 2014 without polpys. No h/o colon polyps, but does have Fhx CRC in her father. . 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 She is currently seeing a [facility] therapist to improve her voice. Since the last visit the patient is doing well. Denies having any abdominal pain, nausea, vomiting, diarrhea, constipation, rectal bleeding or melena. Appetite is fine and weight is stable.No heartburn, regurgitation dysphagia. No throat clearing or persistent coughing.There are no new complaints. The present regimen is being followed.

## 2025-02-12 ENCOUNTER — WEB ENCOUNTER (OUTPATIENT)
Dept: URBAN - METROPOLITAN AREA CLINIC 98 | Facility: CLINIC | Age: 75
End: 2025-02-12

## 2025-02-14 ENCOUNTER — OFFICE VISIT (OUTPATIENT)
Dept: URBAN - METROPOLITAN AREA MEDICAL CENTER 28 | Facility: MEDICAL CENTER | Age: 75
End: 2025-02-14
Payer: MEDICARE

## 2025-02-14 DIAGNOSIS — D12.2 ADENOMA OF ASCENDING COLON: ICD-10-CM

## 2025-02-14 DIAGNOSIS — Z86.0101 HISTORY OF ADENOMATOUS AND SERRATED COLON POLYPS: ICD-10-CM

## 2025-02-14 DIAGNOSIS — D12.5 ADENOMA OF SIGMOID COLON: ICD-10-CM

## 2025-02-14 DIAGNOSIS — D12.0 ADENOMA OF CECUM: ICD-10-CM

## 2025-02-14 PROCEDURE — 45385 COLONOSCOPY W/LESION REMOVAL: CPT | Performed by: INTERNAL MEDICINE

## 2025-02-14 PROCEDURE — 0529F INTRVL 3/>YR PTS CLNSCP DOCD: CPT | Performed by: INTERNAL MEDICINE

## 2025-02-14 PROCEDURE — 45380 COLONOSCOPY AND BIOPSY: CPT | Performed by: INTERNAL MEDICINE
